# Patient Record
Sex: MALE | Race: BLACK OR AFRICAN AMERICAN | NOT HISPANIC OR LATINO | Employment: FULL TIME | URBAN - METROPOLITAN AREA
[De-identification: names, ages, dates, MRNs, and addresses within clinical notes are randomized per-mention and may not be internally consistent; named-entity substitution may affect disease eponyms.]

---

## 2020-09-18 ENCOUNTER — HOSPITAL ENCOUNTER (EMERGENCY)
Facility: HOSPITAL | Age: 30
Discharge: HOME | End: 2020-09-18
Attending: EMERGENCY MEDICINE

## 2020-09-18 VITALS
BODY MASS INDEX: 26.03 KG/M2 | OXYGEN SATURATION: 100 % | WEIGHT: 162 LBS | HEIGHT: 66 IN | TEMPERATURE: 98 F | HEART RATE: 87 BPM | RESPIRATION RATE: 18 BRPM

## 2020-09-18 DIAGNOSIS — K08.89 PAIN, DENTAL: Primary | ICD-10-CM

## 2020-09-18 DIAGNOSIS — R51.9 ACUTE NONINTRACTABLE HEADACHE, UNSPECIFIED HEADACHE TYPE: ICD-10-CM

## 2020-09-18 PROCEDURE — 63600175 PHARM REV CODE 636 W HCPCS: Performed by: PHYSICIAN ASSISTANT

## 2020-09-18 PROCEDURE — 96372 THER/PROPH/DIAG INJ SC/IM: CPT

## 2020-09-18 PROCEDURE — 25000003 PHARM REV CODE 250: Performed by: PHYSICIAN ASSISTANT

## 2020-09-18 PROCEDURE — 99284 EMERGENCY DEPT VISIT MOD MDM: CPT | Mod: 25

## 2020-09-18 RX ORDER — KETOROLAC TROMETHAMINE 30 MG/ML
15 INJECTION, SOLUTION INTRAMUSCULAR; INTRAVENOUS
Status: COMPLETED | OUTPATIENT
Start: 2020-09-18 | End: 2020-09-18

## 2020-09-18 RX ORDER — HYDROXYZINE PAMOATE 50 MG/1
50 CAPSULE ORAL 4 TIMES DAILY PRN
Qty: 20 CAPSULE | Refills: 0 | Status: SHIPPED | OUTPATIENT
Start: 2020-09-18 | End: 2020-09-23

## 2020-09-18 RX ORDER — ONDANSETRON 4 MG/1
4 TABLET, ORALLY DISINTEGRATING ORAL
Status: COMPLETED | OUTPATIENT
Start: 2020-09-18 | End: 2020-09-18

## 2020-09-18 RX ORDER — OXYCODONE AND ACETAMINOPHEN 5; 325 MG/1; MG/1
1 TABLET ORAL
Status: COMPLETED | OUTPATIENT
Start: 2020-09-18 | End: 2020-09-18

## 2020-09-18 RX ORDER — DEXTROMETHORPHAN HYDROBROMIDE, GUAIFENESIN 5; 100 MG/5ML; MG/5ML
650 LIQUID ORAL EVERY 8 HOURS
COMMUNITY
End: 2020-09-18

## 2020-09-18 RX ORDER — AMOXICILLIN 500 MG/1
500 CAPSULE ORAL 3 TIMES DAILY
Qty: 21 CAPSULE | Refills: 0 | Status: SHIPPED | OUTPATIENT
Start: 2020-09-18 | End: 2020-09-25

## 2020-09-18 RX ORDER — ACETAMINOPHEN 500 MG
500 TABLET ORAL EVERY 4 HOURS PRN
Qty: 20 TABLET | Refills: 0 | Status: SHIPPED | OUTPATIENT
Start: 2020-09-18 | End: 2020-09-23

## 2020-09-18 RX ORDER — IBUPROFEN 600 MG/1
600 TABLET ORAL EVERY 6 HOURS PRN
Qty: 20 TABLET | Refills: 0 | Status: SHIPPED | OUTPATIENT
Start: 2020-09-18 | End: 2020-09-23

## 2020-09-18 RX ADMIN — ONDANSETRON 4 MG: 4 TABLET, ORALLY DISINTEGRATING ORAL at 11:09

## 2020-09-18 RX ADMIN — KETOROLAC TROMETHAMINE 15 MG: 30 INJECTION, SOLUTION INTRAMUSCULAR at 11:09

## 2020-09-18 RX ADMIN — OXYCODONE HYDROCHLORIDE AND ACETAMINOPHEN 1 TABLET: 5; 325 TABLET ORAL at 11:09

## 2020-09-18 NOTE — DISCHARGE INSTRUCTIONS
Take Amoxil for dental infection, Ibuprofen and Tylenol for pain as needed. Vistaril for insomnia. Follow up with dental clinic in 1-2 days. Return to ER for difficulty breathing or swallowing, chest pain, shortness of breath or as needed.
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
## 2020-09-18 NOTE — Clinical Note
"Sixto Garcia" Tino was seen and treated in our emergency department on 9/18/2020.  He may return to work on 09/20/2020.       If you have any questions or concerns, please don't hesitate to call.      Bren Hubbard PA-C"
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
